# Patient Record
Sex: MALE | NOT HISPANIC OR LATINO | URBAN - METROPOLITAN AREA
[De-identification: names, ages, dates, MRNs, and addresses within clinical notes are randomized per-mention and may not be internally consistent; named-entity substitution may affect disease eponyms.]

---

## 2021-03-21 ENCOUNTER — IMMUNIZATION (OUTPATIENT)
Dept: VACCINE CLINIC | Facility: HOSPITAL | Age: 31
End: 2021-03-21

## 2021-03-21 PROCEDURE — 0001A: CPT | Performed by: INTERNAL MEDICINE

## 2021-03-21 PROCEDURE — 91300 HC SARSCOV02 VAC 30MCG/0.3ML IM: CPT | Performed by: INTERNAL MEDICINE

## 2021-04-11 ENCOUNTER — IMMUNIZATION (OUTPATIENT)
Dept: VACCINE CLINIC | Facility: HOSPITAL | Age: 31
End: 2021-04-11

## 2021-04-11 PROCEDURE — 91300 HC SARSCOV02 VAC 30MCG/0.3ML IM: CPT | Performed by: INTERNAL MEDICINE

## 2021-04-11 PROCEDURE — 0002A: CPT | Performed by: INTERNAL MEDICINE

## 2022-10-06 ENCOUNTER — APPOINTMENT (OUTPATIENT)
Dept: GENERAL RADIOLOGY | Facility: HOSPITAL | Age: 32
End: 2022-10-06

## 2022-10-06 ENCOUNTER — HOSPITAL ENCOUNTER (EMERGENCY)
Facility: HOSPITAL | Age: 32
Discharge: HOME | End: 2022-10-06
Attending: EMERGENCY MEDICINE | Admitting: EMERGENCY MEDICINE

## 2022-10-06 VITALS
HEIGHT: 72 IN | SYSTOLIC BLOOD PRESSURE: 129 MMHG | DIASTOLIC BLOOD PRESSURE: 81 MMHG | BODY MASS INDEX: 35.21 KG/M2 | RESPIRATION RATE: 18 BRPM | TEMPERATURE: 96.8 F | HEART RATE: 89 BPM | WEIGHT: 260 LBS | OXYGEN SATURATION: 98 %

## 2022-10-06 DIAGNOSIS — R05.1 ACUTE COUGH: Primary | ICD-10-CM

## 2022-10-06 DIAGNOSIS — J98.8 VIRAL RESPIRATORY ILLNESS: ICD-10-CM

## 2022-10-06 DIAGNOSIS — B97.89 VIRAL RESPIRATORY ILLNESS: ICD-10-CM

## 2022-10-06 PROCEDURE — 71045 X-RAY EXAM CHEST 1 VIEW: CPT

## 2022-10-06 PROCEDURE — 99282 EMERGENCY DEPT VISIT SF MDM: CPT

## 2022-10-06 RX ORDER — PREDNISONE 10 MG/1
10 TABLET ORAL DAILY
COMMUNITY
Start: 2022-10-05

## 2022-10-06 RX ORDER — ALBUTEROL SULFATE 90 UG/1
2 AEROSOL, METERED RESPIRATORY (INHALATION)
COMMUNITY
Start: 2022-10-05

## 2022-10-06 RX ORDER — DEXTROMETHORPHAN HYDROBROMIDE AND PROMETHAZINE HYDROCHLORIDE 15; 6.25 MG/5ML; MG/5ML
5 SYRUP ORAL
COMMUNITY
Start: 2022-10-05

## 2022-10-06 NOTE — DISCHARGE INSTRUCTIONS
Continue your current medications that you were recently prescribed  Increase fluids  Tylenol or ibuprofen as needed for fever body aches  Rest  Follow-up with your PMD in 5 to 7 days if symptoms not improving  Return to the ER for fever, chills, chest pain, shortness of breath or dizziness, weakness, nausea, vomiting or any new or worsening symptoms

## 2022-10-06 NOTE — ED PROVIDER NOTES
"The MICHAEL and I have discussed this patient's history, physical exam, and treatment plan.  I have reviewed the documentation and personally had a face to face interaction with the patient. I affirm the documentation and agree with the treatment and plan.  The attached note describes my personal findings.      I provided a substantive portion of the care of the patient.  I personally performed the physical exam in its entirety, and below are my findings.     Brief history of present illness: 32-year-old male with cough rhinorrhea and congestion x5 days.  No fever or hemoptysis.    Physical exam:    /88 (BP Location: Right arm, Patient Position: Sitting)   Pulse 108   Temp 96.8 °F (36 °C) (Tympanic)   Resp 18   Ht 182.9 cm (72\")   Wt 118 kg (260 lb)   SpO2 100%   BMI 35.26 kg/m²       Physical Exam   Constitutional: No distress.  Nontoxic  HENT:  Head: Normocephalic and atraumatic.   Oropharynx: Mucous membranes are moist.   Eyes: . No scleral icterus. No conjunctival pallor.  Bilateral conjunctival injection  Neck: Normal range of motion. Neck supple.   Cardiovascular: Pink warm and well perfused throughout.    Pulmonary/Chest: No respiratory distress.  No tachypnea or increased work of breathing appreciated.  Occasional dry cough.  Musculoskeletal: Moves all extremities equally.    Neurological: Alert and oriented.  No acute focal deficit appreciated.  Skin: Skin is pink, warm, and dry.   Psychiatric: Mood and affect normal.   Nursing note and vitals reviewed.        MDM: Agree with plan for chest x-ray.       Omar Poole MD  10/06/22 1028    "

## 2022-10-06 NOTE — ED PROVIDER NOTES
EMERGENCY DEPARTMENT ENCOUNTER    Room Number:  A01/01  Date of encounter:  10/6/2022  PCP: Provider, No Known  Historian: Patient      PPE    Patient was placed in face mask in first look. Patient was wearing facemask when I entered the room and throughout our encounter. I wore full protective equipment throughout this patient encounter including a face mask, and gloves. Hand hygiene was performed before donning protective equipment and after removal when leaving the room.        HPI:  Chief Complaint: Cough  A complete HPI/ROS/PMH/PSH/SH/FH are unobtainable due to: Nothing    Context: Jimmy Ken is a 32 y.o. male with a history of asthma who arrives to the ED via private vehicle.  Patient presents with c/o nonproductive cough for the past week.   Patient also complains of stuffy nose, congestion.  Patient states he was seen at the immediate care center yesterday and was prescribed Zithromax, albuterol inhaler, prednisone in Phenergan DM cough syrup however he is only been taking it since 2 AM yesterday.  He states he does not like he is feeling any better.  Patient denies fever, chills, nausea, vomiting, chest pain, dizziness.  Patient states that nothing makes the symptoms better and nothing worsens symptoms.          PAST MEDICAL HISTORY  Active Ambulatory Problems     Diagnosis Date Noted   • No Active Ambulatory Problems     Resolved Ambulatory Problems     Diagnosis Date Noted   • No Resolved Ambulatory Problems     Past Medical History:   Diagnosis Date   • Asthma          PAST SURGICAL HISTORY  Past Surgical History:   Procedure Laterality Date   • CHOLECYSTECTOMY           FAMILY HISTORY  History reviewed. No pertinent family history.      SOCIAL HISTORY  Social History     Socioeconomic History   • Marital status: Unknown   Tobacco Use   • Smoking status: Light Tobacco Smoker   • Smokeless tobacco: Current User     Types: Snuff   Vaping Use   • Vaping Use: Never used   Substance and Sexual Activity   •  Alcohol use: Yes     Alcohol/week: 1.0 standard drink     Types: 1 Cans of beer per week   • Drug use: Never         ALLERGIES  Patient has no known allergies.        REVIEW OF SYSTEMS  Review of Systems     All systems reviewed and negative except for those discussed in HPI.        PHYSICAL EXAM    ED Triage Vitals   Temp Heart Rate Resp BP SpO2   10/06/22 0949 10/06/22 0949 10/06/22 0949 10/06/22 1000 10/06/22 0949   96.8 °F (36 °C) 119 18 134/88 100 %       Physical Exam  GENERAL: Well appearing, nontoxic appearing, not distressed  HENT: normocephalic, atraumatic  EYES: no scleral icterus, PERRL  CV: regular rhythm, regular rate, no murmur  RESPIRATORY: normal effort, CTAB  ABDOMEN: soft, nontender  MUSCULOSKELETAL: no deformity  NEURO: alert, moves all extremities, follows commands, mental status normal/baseline  SKIN: warm, dry, no rash   Psych: Appropriate mood and affect  Nursing notes and vital signs reviewed      LAB RESULTS  No results found for this or any previous visit (from the past 24 hour(s)).    Ordered the above labs and independently reviewed the results.      RADIOLOGY  XR Chest 1 View    Result Date: 10/6/2022  SINGLE VIEW CHEST RADIOGRAPH  HISTORY: 32-year-old male with a history of cough, congestion and asthma.  FINDINGS: An upright AP chest radiograph was obtained and demonstrates the lungs are normal in volume and are clear. The cardiomediastinal silhouette, pulmonary vasculature and osseous structures appear normal.      Negative chest radiograph.  This report was finalized on 10/6/2022 11:38 AM by Dr. Giovanni Fang M.D.        I ordered the above noted radiological studies and viewed the images on the PACS system.         MEDICAL RECORD REVIEW  Records reviewed in [facility], patient was seen at the urgent care on October 4 was prescribed Zithromax, albuterol, prednisone and Phenergan DM.  He had a negative strep, flu and COVID swab during that  visit.      PROCEDURES    Procedures        DIFFERENTIAL DIAGNOSIS  Differential diagnosis include but are not limited to the following:  URI, bronchitis, pneumonia, COPD exacerbation, asthma, CAD, CHF, lung infection(bacterial/viral).        PROGRESS, DATA ANALYSIS, CONSULTS, AND MEDICAL DECISION MAKING        ED Course as of 10/06/22 1948   Thu Oct 06, 2022   1019 Patient is a well-appearing 32-year-old who presents today with respiratory viral symptoms.  He was started on medication yesterday and is only taken 1 or 2 doses.  We will do a chest x-ray to rule out pneumonia.  Otherwise I discussed with patient that this will take time to get better.  Patient verbalized understanding and is agreeable to this plan. [MS]   1200 Reviewed pt's history and workup with Dr. Poole.  After a bedside evaluation, he agrees with the plan of care.     [MS]   1230 Patient updated on unremarkable chest x-ray, discussed again that this is more than likely a respiratory viral illness and will take time to get better.  He was instructed to continue the medications he was previously prescribed.  Rest, increase fluids.  Follow-up with PMD next week if not improving. [MS]      ED Course User Index  [MS] Blanca Paredes APRN       Discussed plan for discharge, as there is no emergent indication for admission. Pt/family is agreeable and understands need for follow up and repeat testing.  Pt is aware that discharge does not mean that nothing is wrong but it indicates no emergency is present that requires admission and they must continue care with follow-up as given below or physician of their choice.   Patient/Family voiced understanding of above instructions.  Patient discharged in stable condition.    DIAGNOSIS  Final diagnoses:   Acute cough   Viral respiratory illness       FOLLOW UP   PATIENT CONNECTION - [facility] [address]  [phone]  Schedule an appointment as soon as possible for a visit   If symptoms  worsen      RX     Medication List      No changes were made to your prescriptions during this visit.             MEDICATIONS GIVEN IN ED    Medications - No data to display        COURSE & MEDICAL DECISION MAKING  Any/All labs and Any/All Imaging studies that were ordered were reviewed and are noted above.  Results were reviewed/discussed with the patient and they were also made aware of online access.    Pt also made aware that some labs, such as cultures, will not be resulted during ER visit and followup with PMD is necessary.        Blanca Paredes, APRN  10/06/22 1948

## 2022-10-06 NOTE — ED TRIAGE NOTES
Chief Complaint   Patient presents with   • Cough   • Shortness of Breath     Nasal congestion x1 week     Presented via private car with above complaint. Negative covid at [facility]. AOx4 and ambulatory in triage.     Patient was placed in face mask during first look triage.  Patient was wearing a face mask throughout encounter.  I wore personal protective equipment throughout the encounter.  Hand hygiene was performed before and after patient encounter.